# Patient Record
Sex: FEMALE | Race: BLACK OR AFRICAN AMERICAN | NOT HISPANIC OR LATINO | Employment: UNEMPLOYED | URBAN - METROPOLITAN AREA
[De-identification: names, ages, dates, MRNs, and addresses within clinical notes are randomized per-mention and may not be internally consistent; named-entity substitution may affect disease eponyms.]

---

## 2023-04-19 ENCOUNTER — OFFICE VISIT (OUTPATIENT)
Dept: PRIMARY CARE | Facility: CLINIC | Age: 63
End: 2023-04-19
Payer: MEDICARE

## 2023-04-19 VITALS
HEART RATE: 104 BPM | TEMPERATURE: 97.5 F | OXYGEN SATURATION: 97 % | SYSTOLIC BLOOD PRESSURE: 122 MMHG | DIASTOLIC BLOOD PRESSURE: 78 MMHG | WEIGHT: 178 LBS

## 2023-04-19 DIAGNOSIS — Z12.31 ENCOUNTER FOR SCREENING MAMMOGRAM FOR MALIGNANT NEOPLASM OF BREAST: ICD-10-CM

## 2023-04-19 DIAGNOSIS — G43.009 MIGRAINE WITHOUT AURA, NOT REFRACTORY: ICD-10-CM

## 2023-04-19 DIAGNOSIS — F32.1 MODERATE MAJOR DEPRESSION, SINGLE EPISODE (MULTI): Primary | ICD-10-CM

## 2023-04-19 DIAGNOSIS — F17.200 NICOTINE USE DISORDER: ICD-10-CM

## 2023-04-19 DIAGNOSIS — E78.2 MIXED HYPERLIPIDEMIA: ICD-10-CM

## 2023-04-19 DIAGNOSIS — I10 ESSENTIAL HYPERTENSION: ICD-10-CM

## 2023-04-19 DIAGNOSIS — R73.01 IFG (IMPAIRED FASTING GLUCOSE): ICD-10-CM

## 2023-04-19 PROCEDURE — 3078F DIAST BP <80 MM HG: CPT | Performed by: FAMILY MEDICINE

## 2023-04-19 PROCEDURE — 3074F SYST BP LT 130 MM HG: CPT | Performed by: FAMILY MEDICINE

## 2023-04-19 PROCEDURE — 99214 OFFICE O/P EST MOD 30 MIN: CPT | Performed by: FAMILY MEDICINE

## 2023-04-19 RX ORDER — AMLODIPINE BESYLATE 10 MG/1
10 TABLET ORAL DAILY
COMMUNITY
End: 2023-04-19 | Stop reason: SDUPTHER

## 2023-04-19 RX ORDER — PERPHENAZINE/AMITRIPTYLINE HCL 2 MG-25 MG
TABLET ORAL
COMMUNITY
End: 2023-05-11 | Stop reason: WASHOUT

## 2023-04-19 RX ORDER — VENLAFAXINE HYDROCHLORIDE 75 MG/1
75 CAPSULE, EXTENDED RELEASE ORAL EVERY MORNING
COMMUNITY
End: 2023-10-18 | Stop reason: SDUPTHER

## 2023-04-19 RX ORDER — AMLODIPINE BESYLATE 10 MG/1
10 TABLET ORAL DAILY
Qty: 90 TABLET | Refills: 1 | Status: SHIPPED | OUTPATIENT
Start: 2023-04-19 | End: 2023-10-18 | Stop reason: SDUPTHER

## 2023-04-19 NOTE — Clinical Note
Can you please call patient to let her know that I placed an order for mammogram, make sure to schedule that appointment with juaquin

## 2023-04-19 NOTE — PATIENT INSTRUCTIONS
Chronic conditions controlled  Encouraged to continue eating healthy and exercising daily   Medications were reviewed and refilled   Discussed following  Tobacco use: Encouraged to discontinue smoking, patient is not ready  IFG: Encouraged to decrease carbs/sweets to improve A1c  Preventative: Mammogram ordered, discussed trying Cologuard or repeating colonoscopy patient is hesitant  Follow up in 6 months

## 2023-04-19 NOTE — PROGRESS NOTES
Assessment     ASSESSMENT/PLAN:      Problem List Items Addressed This Visit          Circulatory    Essential hypertension    Relevant Medications    amLODIPine (Norvasc) 10 mg tablet       Endocrine/Metabolic    IFG (impaired fasting glucose)    Relevant Orders    Hemoglobin A1c    Comprehensive Metabolic Panel       Other    Moderate major depression, single episode (CMS/HCC) - Primary    Migraine without aura, not refractory    Nicotine use disorder     Other Visit Diagnoses       Mixed hyperlipidemia        Relevant Orders    Lipid panel    Encounter for screening mammogram for malignant neoplasm of breast        Relevant Orders    BI mammo bilateral screening tomosynthesis            Patient Instructions:  Patient Instructions   Chronic conditions controlled  Encouraged to continue eating healthy and exercising daily   Medications were reviewed and refilled   Discussed following  Tobacco use: Encouraged to discontinue smoking, patient is not ready  IFG: Encouraged to decrease carbs/sweets to improve A1c  Preventative: Mammogram ordered, discussed trying Cologuard or repeating colonoscopy patient is hesitant  Follow up in 6 months      Signed by: Anjum Fortune,        FUTURE DIRECTION:   Continue encouraging tobacco cessation, review mammogram and labs    Subjective   SUBJECTIVE:     HPI : Patient is a 62 y.o. female who presents today for the following:     HTN: Amlodipine 10 mg,   - has not been adhering to low sodium diet   - still smoking cigarettes, not ready to quit    Chronic Pain   - use tumeric daily, and otc NSAIDs   - Dr. Jaimes  for pain management     Anxiety/depression:  -Venlafaxine 75 mg, follows psych at [facility]    IFG:  -Poor diet, active with her grandchildren    Preventative  Mammogram: last one 2020, nml, yearly , not interested   Colonoscopy: done 2016, needs to 5 years   Vaccination: Declines flu  Pap: S/p hysterectomy    Review of Systems   All other systems reviewed and are  negative.      History reviewed. No pertinent past medical history.     History reviewed. No pertinent surgical history.     Current Outpatient Medications   Medication Instructions    amLODIPine (NORVASC) 10 mg, oral, Daily    fish oil concentrate (Omega-3) 120-180 mg capsule oral    flaxseed-omega3,6,9-fatty acid 1,300-670-155 mg capsule oral    venlafaxine XR (EFFEXOR-XR) 75 mg, oral, Every morning        Allergies   Allergen Reactions    Penicillin Other        Social History     Socioeconomic History    Marital status:      Spouse name: Not on file    Number of children: Not on file    Years of education: Not on file    Highest education level: Not on file   Occupational History    Not on file   Tobacco Use    Smoking status: Every Day     Packs/day: 2.00     Types: Cigarettes    Smokeless tobacco: Never   Vaping Use    Vaping status: Not on file   Substance and Sexual Activity    Alcohol use: Not on file    Drug use: Not on file    Sexual activity: Not on file   Other Topics Concern    Not on file   Social History Narrative    Not on file     Social Determinants of Health     Financial Resource Strain: Not on file   Food Insecurity: Not on file   Transportation Needs: Not on file   Physical Activity: Not on file   Stress: Not on file   Social Connections: Not on file   Intimate Partner Violence: Not on file   Housing Stability: Not on file        No family history on file.     Objective     OBJECTIVE:     Vitals:    04/19/23 1028   BP: 122/78   Pulse: 104   Temp: 36.4 °C (97.5 °F)   SpO2: 97%   Weight: 80.7 kg (178 lb)        Physical Exam  HENT:      Head: Normocephalic and atraumatic.      Nose: Nose normal.      Mouth/Throat:      Mouth: Mucous membranes are moist.   Eyes:      Pupils: Pupils are equal, round, and reactive to light.   Cardiovascular:      Rate and Rhythm: Normal rate and regular rhythm.      Pulses: Normal pulses.      Heart sounds: No murmur heard.  Pulmonary:      Effort: Pulmonary  effort is normal.      Breath sounds: Normal breath sounds.   Abdominal:      Tenderness: There is no abdominal tenderness.   Musculoskeletal:         General: Normal range of motion.      Cervical back: Normal range of motion.   Skin:     General: Skin is warm and dry.   Neurological:      Mental Status: She is alert.   Psychiatric:         Mood and Affect: Mood normal.

## 2023-04-26 RX ORDER — AMLODIPINE BESYLATE 10 MG/1
TABLET ORAL
Qty: 90 TABLET | Refills: 1 | OUTPATIENT
Start: 2023-04-26

## 2023-04-27 NOTE — TELEPHONE ENCOUNTER
----- Message from Zhang France MD sent at 4/27/2023  1:21 PM EDT -----  Please let pt know that her mammogram was normal.    ----- Message -----  From: [facility] - Radiology Results In  Sent: 4/27/2023   9:07 AM EDT  To: Anjum Fortune DO

## 2023-04-27 NOTE — TELEPHONE ENCOUNTER
----- Message from Zhang France MD sent at 4/27/2023  1:21 PM EDT -----  Please let pt know that her mammogram was normal.    ----- Message -----  From: [facility] - Radiology Results In  Sent: 4/27/2023   9:07 AM EDT  To: Anjum Fortune DO

## 2023-05-11 ENCOUNTER — OFFICE VISIT (OUTPATIENT)
Dept: PRIMARY CARE | Facility: CLINIC | Age: 63
End: 2023-05-11
Payer: MEDICARE

## 2023-05-11 VITALS
OXYGEN SATURATION: 96 % | HEART RATE: 88 BPM | SYSTOLIC BLOOD PRESSURE: 120 MMHG | WEIGHT: 179 LBS | DIASTOLIC BLOOD PRESSURE: 72 MMHG

## 2023-05-11 DIAGNOSIS — L30.8 OTHER ECZEMA: Primary | ICD-10-CM

## 2023-05-11 PROCEDURE — 3078F DIAST BP <80 MM HG: CPT | Performed by: FAMILY MEDICINE

## 2023-05-11 PROCEDURE — 3074F SYST BP LT 130 MM HG: CPT | Performed by: FAMILY MEDICINE

## 2023-05-11 PROCEDURE — 99214 OFFICE O/P EST MOD 30 MIN: CPT | Performed by: FAMILY MEDICINE

## 2023-05-11 RX ORDER — CLOBETASOL PROPIONATE 0.5 MG/G
CREAM TOPICAL 2 TIMES DAILY
Qty: 180 G | Refills: 0 | Status: SHIPPED | OUTPATIENT
Start: 2023-05-11 | End: 2023-06-10

## 2023-05-11 NOTE — PROGRESS NOTES
Assessment     ASSESSMENT/PLAN:      Problem List Items Addressed This Visit    None  Visit Diagnoses       Other eczema    -  Primary    Relevant Medications    clobetasol (Temovate) 0.05 % cream          Patient Instructions:  Patient Instructions   Eczema: Try clobetasol, can call if no improvement in a few days for steroid taper, note provided for patient      Signed by: Anjum Fortune DO       FUTURE DIRECTION:   Consider 7-day steroid taper    Subjective   SUBJECTIVE:     HPI : Patient is a 62 y.o. female who presents today for the following:     Rash   State sthats he has history of eczema  - noticed on both hand, burning, itcching, and purulent discharge  - states that had burning with different creams she has tried, coconut oil seems to improve symptoms  -Was seen by dermatology in the past, had tried UV lights  -Tried old prescription of triamcinolone without significant improvement      Review of Systems   All other systems reviewed and are negative.      No past medical history on file.     No past surgical history on file.     Current Outpatient Medications   Medication Instructions    amLODIPine (NORVASC) 10 mg, oral, Daily    clobetasol (Temovate) 0.05 % cream Topical, 2 times daily    fish oil concentrate (Omega-3) 120-180 mg capsule oral    flaxseed-omega3,6,9-fatty acid 1,300-670-155 mg capsule oral    venlafaxine XR (EFFEXOR-XR) 75 mg, oral, Every morning        Allergies   Allergen Reactions    Penicillin Other        Social History     Socioeconomic History    Marital status:      Spouse name: Not on file    Number of children: Not on file    Years of education: Not on file    Highest education level: Not on file   Occupational History    Not on file   Tobacco Use    Smoking status: Every Day     Packs/day: 2.00     Types: Cigarettes    Smokeless tobacco: Never   Vaping Use    Vaping status: Not on file   Substance and Sexual Activity    Alcohol use: Not on file    Drug use: Not  on file    Sexual activity: Not on file   Other Topics Concern    Not on file   Social History Narrative    Not on file     Social Determinants of Health     Financial Resource Strain: Not on file   Food Insecurity: Not on file   Transportation Needs: Not on file   Physical Activity: Not on file   Stress: Not on file   Social Connections: Not on file   Intimate Partner Violence: Not on file   Housing Stability: Not on file        No family history on file.     Objective     OBJECTIVE:     Vitals:    05/11/23 0940   BP: 120/72   Pulse: 88   SpO2: 96%   Weight: 81.2 kg (179 lb)        Physical Exam  Constitutional:       Appearance: Normal appearance.   HENT:      Head: Normocephalic.   Pulmonary:      Effort: Pulmonary effort is normal.   Musculoskeletal:      Cervical back: Normal range of motion.   Skin:     Comments: Bilateral hands with diffuse dryness, cracking of skin, evidence of excoriation, mildly erythematous   Neurological:      Mental Status: She is alert.   Psychiatric:         Mood and Affect: Mood normal.

## 2023-05-11 NOTE — PATIENT INSTRUCTIONS
Eczema: Try clobetasol, can call if no improvement in a few days for steroid taper, note provided for patient

## 2023-05-11 NOTE — LETTER
Gisel Downs is a patient at [facility]. She does not have any medical conditions that would prevent her from donating plasma. Please call if you have any questions.       Kind Regards,   Anjum Fortune, DO

## 2023-07-19 ENCOUNTER — OFFICE VISIT (OUTPATIENT)
Dept: PRIMARY CARE | Facility: CLINIC | Age: 63
End: 2023-07-19
Payer: MEDICARE

## 2023-07-19 VITALS
HEART RATE: 81 BPM | TEMPERATURE: 97.9 F | SYSTOLIC BLOOD PRESSURE: 126 MMHG | DIASTOLIC BLOOD PRESSURE: 72 MMHG | WEIGHT: 179 LBS | OXYGEN SATURATION: 94 %

## 2023-07-19 DIAGNOSIS — L30.8 OTHER ECZEMA: Primary | ICD-10-CM

## 2023-07-19 PROBLEM — L30.1 DYSHIDROTIC DERMATITIS: Status: ACTIVE | Noted: 2023-07-19

## 2023-07-19 PROCEDURE — 3078F DIAST BP <80 MM HG: CPT | Performed by: FAMILY MEDICINE

## 2023-07-19 PROCEDURE — 99213 OFFICE O/P EST LOW 20 MIN: CPT | Performed by: FAMILY MEDICINE

## 2023-07-19 PROCEDURE — 3074F SYST BP LT 130 MM HG: CPT | Performed by: FAMILY MEDICINE

## 2023-07-19 RX ORDER — PREDNISONE 10 MG/1
TABLET ORAL
Qty: 20 TABLET | Refills: 0 | Status: SHIPPED | OUTPATIENT
Start: 2023-07-19 | End: 2023-07-28

## 2023-07-19 RX ORDER — PREDNISONE 10 MG/1
TABLET ORAL
Qty: 11 TABLET | Refills: 0 | Status: SHIPPED | OUTPATIENT
Start: 2023-07-19 | End: 2023-07-19 | Stop reason: SDUPTHER

## 2023-07-19 NOTE — PROGRESS NOTES
Assessment/Plan   ASSESSMENT/PLAN:      Gisel was seen today for eczema.  Diagnoses and all orders for this visit:  Other eczema (Primary)  -     Discontinue: predniSONE (Deltasone) 10 mg tablet; Take 4 tablets (40 mg) by mouth once daily for 1 day, THEN 3 tablets (30 mg) once daily for 1 day, THEN 2 tablets (20 mg) once daily for 1 day, THEN 1 tablet (10 mg) once daily for 1 day, THEN 0.5 tablets (5 mg) once daily for 2 days.  -     predniSONE (Deltasone) 10 mg tablet; Take 4 tablets (40 mg) by mouth once daily for 2 days, THEN 3 tablets (30 mg) once daily for 2 days, THEN 2 tablets (20 mg) once daily for 2 days, THEN 1 tablet (10 mg) once daily for 1 day, THEN 0.5 tablets (5 mg) once daily for 2 days.       Patient Instructions   Eczema: prednisone taper ordered, continue clobetasol, call for derm referral if no improvement     Anjum Fortune, DO     FUTURE DIRECTION:     Subjective   SUBJECTIVE:     Patient ID: Gisel Downs is a 62 y.o. femalefor the following:    Eczema  - worsening since recent travel  - notice clear to cloudy drainage   - clobetasol ineffective     Review of Systems   Skin:  Positive for rash.       Allergies   Allergen Reactions    Penicillin Other         Current Outpatient Medications:     amLODIPine (Norvasc) 10 mg tablet, Take 1 tablet (10 mg) by mouth once daily., Disp: 90 tablet, Rfl: 1    cholecalciferol, vitamin D3, (VITAMIN D3 ORAL), Take by mouth., Disp: , Rfl:     omega-3/dha/epa/fish oil (OMEGA-3 FISH OIL ORAL), Take by mouth., Disp: , Rfl:     TURMERIC ORAL, Take by mouth., Disp: , Rfl:     venlafaxine XR (Effexor-XR) 75 mg 24 hr capsule, Take 1 capsule (75 mg) by mouth once daily in the morning., Disp: , Rfl:     vitamin E acetate (VITAMIN E ORAL), Take by mouth., Disp: , Rfl:     predniSONE (Deltasone) 10 mg tablet, Take 4 tablets (40 mg) by mouth once daily for 2 days, THEN 3 tablets (30 mg) once daily for 2 days, THEN 2 tablets (20 mg) once daily for 2 days, THEN 1  tablet (10 mg) once daily for 1 day, THEN 0.5 tablets (5 mg) once daily for 2 days., Disp: 20 tablet, Rfl: 0     Patient Active Problem List   Diagnosis    Moderate major depression, single episode (CMS/HCC)    Essential hypertension    Migraine without aura, not refractory    Nicotine use disorder    IFG (impaired fasting glucose)    Dyshidrotic dermatitis       Social History     Socioeconomic History    Marital status:      Spouse name: Not on file    Number of children: Not on file    Years of education: Not on file    Highest education level: Not on file   Occupational History    Not on file   Tobacco Use    Smoking status: Every Day     Packs/day: 2.00     Types: Cigarettes    Smokeless tobacco: Never   Substance and Sexual Activity    Alcohol use: Not on file    Drug use: Not on file    Sexual activity: Not on file   Other Topics Concern    Not on file   Social History Narrative    Not on file     Social Determinants of Health     Financial Resource Strain: Not on file   Food Insecurity: Not on file   Transportation Needs: Not on file   Physical Activity: Not on file   Stress: Not on file   Social Connections: Not on file   Intimate Partner Violence: Not on file   Housing Stability: Not on file       Objective   OBJECTIVE:     Vitals:    07/19/23 1028   BP: 126/72   Pulse: 81   Temp: 36.6 °C (97.9 °F)   SpO2: 94%       Exam      Physical Exam  Constitutional:       Appearance: Normal appearance.   HENT:      Head: Normocephalic.   Pulmonary:      Effort: Pulmonary effort is normal.   Musculoskeletal:      Cervical back: Normal range of motion.   Skin:     Findings: Rash present. Rash is scaling (on bilateral hands, no purulent drainage, +errythema).   Neurological:      Mental Status: She is alert.   Psychiatric:         Mood and Affect: Mood normal.

## 2023-08-15 ENCOUNTER — TELEPHONE (OUTPATIENT)
Dept: PRIMARY CARE | Facility: CLINIC | Age: 63
End: 2023-08-15
Payer: MEDICARE

## 2023-08-15 DIAGNOSIS — L30.8 OTHER ECZEMA: Primary | ICD-10-CM

## 2023-08-15 NOTE — TELEPHONE ENCOUNTER
Pt called rx line at 1209p requesting a refill on prednisone.    Pt was seen 7/19/23, per note if not better, ok for derm referral.  Can you place referral for derm? Thx

## 2023-08-29 RX ORDER — PREDNISONE 10 MG/1
10 TABLET ORAL DAILY
OUTPATIENT
Start: 2023-08-29

## 2023-08-29 RX ORDER — PREDNISONE 10 MG/1
10 TABLET ORAL DAILY
COMMUNITY
End: 2023-10-18

## 2023-08-29 NOTE — TELEPHONE ENCOUNTER
Pt called requesting pended med    Pt cannot get in with derm until 12/4/23  Pt is requesting a refill on pended med, to get her until that derm appt, since this med worked well for her.  Pt uses [facility]    Next OV 10/18 - eoi    Please advise

## 2023-09-07 ENCOUNTER — TELEMEDICINE (OUTPATIENT)
Dept: PRIMARY CARE | Facility: CLINIC | Age: 63
End: 2023-09-07
Payer: MEDICARE

## 2023-09-07 NOTE — PROGRESS NOTES
Assessment     ASSESSMENT/PLAN:      Problem List Items Addressed This Visit    None        Patient Instructions:  There are no Patient Instructions on file for this visit.      Patient consented to virtual encounter. If patient was felt to need in-person evaluation they were directed to the office or ED as appropriate. Total time spent interacting with patient was [***] minutes.     Patient presents today via video telemedicine encounter. This encounter is not taking place in person due to COVID.    Signed by: Anjum Fortune DO       FUTURE DIRECTION:   [***]    Subjective     SUBJECTIVE:     HPI : Patient is a 62 y.o. female who presents today via video telemedicine encounter to discuss the following:        Review of Systems    Objective   OBJECTIVE:     There were no vitals filed for this visit.    Physical Exam

## 2023-10-18 ENCOUNTER — OFFICE VISIT (OUTPATIENT)
Dept: PRIMARY CARE | Facility: CLINIC | Age: 63
End: 2023-10-18
Payer: MEDICARE

## 2023-10-18 VITALS
HEIGHT: 68 IN | OXYGEN SATURATION: 98 % | HEART RATE: 97 BPM | TEMPERATURE: 97.2 F | SYSTOLIC BLOOD PRESSURE: 128 MMHG | WEIGHT: 178 LBS | BODY MASS INDEX: 26.98 KG/M2 | DIASTOLIC BLOOD PRESSURE: 80 MMHG

## 2023-10-18 DIAGNOSIS — Z00.00 ROUTINE GENERAL MEDICAL EXAMINATION AT A HEALTH CARE FACILITY: ICD-10-CM

## 2023-10-18 DIAGNOSIS — F32.1 MODERATE MAJOR DEPRESSION, SINGLE EPISODE (MULTI): ICD-10-CM

## 2023-10-18 DIAGNOSIS — F17.200 NICOTINE USE DISORDER: ICD-10-CM

## 2023-10-18 DIAGNOSIS — I10 ESSENTIAL HYPERTENSION: ICD-10-CM

## 2023-10-18 DIAGNOSIS — L30.8 OTHER ECZEMA: ICD-10-CM

## 2023-10-18 DIAGNOSIS — Z12.11 COLON CANCER SCREENING: ICD-10-CM

## 2023-10-18 DIAGNOSIS — L08.9 SKIN INFECTION: Primary | ICD-10-CM

## 2023-10-18 PROCEDURE — 99396 PREV VISIT EST AGE 40-64: CPT | Performed by: FAMILY MEDICINE

## 2023-10-18 PROCEDURE — 3074F SYST BP LT 130 MM HG: CPT | Performed by: FAMILY MEDICINE

## 2023-10-18 PROCEDURE — 99214 OFFICE O/P EST MOD 30 MIN: CPT | Performed by: FAMILY MEDICINE

## 2023-10-18 PROCEDURE — 3079F DIAST BP 80-89 MM HG: CPT | Performed by: FAMILY MEDICINE

## 2023-10-18 PROCEDURE — G0439 PPPS, SUBSEQ VISIT: HCPCS | Performed by: FAMILY MEDICINE

## 2023-10-18 RX ORDER — CLOBETASOL PROPIONATE 0.46 MG/ML
SOLUTION TOPICAL 2 TIMES DAILY
Qty: 90 ML | Refills: 2 | Status: SHIPPED | OUTPATIENT
Start: 2023-10-18 | End: 2023-10-18 | Stop reason: WASHOUT

## 2023-10-18 RX ORDER — DOXYCYCLINE 100 MG/1
100 CAPSULE ORAL 2 TIMES DAILY
Qty: 14 CAPSULE | Refills: 0 | Status: SHIPPED | OUTPATIENT
Start: 2023-10-18 | End: 2023-10-25

## 2023-10-18 RX ORDER — VENLAFAXINE HYDROCHLORIDE 75 MG/1
75 CAPSULE, EXTENDED RELEASE ORAL EVERY MORNING
Qty: 90 CAPSULE | Refills: 1 | Status: SHIPPED | OUTPATIENT
Start: 2023-11-18 | End: 2024-04-17 | Stop reason: SDUPTHER

## 2023-10-18 RX ORDER — CLOBETASOL PROPIONATE 0.5 MG/G
1 EMULSION TOPICAL 2 TIMES DAILY
Qty: 90 G | Refills: 2 | Status: SHIPPED | OUTPATIENT
Start: 2023-10-18 | End: 2023-11-01

## 2023-10-18 RX ORDER — AMLODIPINE BESYLATE 10 MG/1
10 TABLET ORAL DAILY
Qty: 90 TABLET | Refills: 1 | Status: SHIPPED | OUTPATIENT
Start: 2023-10-18 | End: 2024-04-17 | Stop reason: SDUPTHER

## 2023-10-18 ASSESSMENT — PATIENT HEALTH QUESTIONNAIRE - PHQ9
10. IF YOU CHECKED OFF ANY PROBLEMS, HOW DIFFICULT HAVE THESE PROBLEMS MADE IT FOR YOU TO DO YOUR WORK, TAKE CARE OF THINGS AT HOME, OR GET ALONG WITH OTHER PEOPLE: NOT DIFFICULT AT ALL
1. LITTLE INTEREST OR PLEASURE IN DOING THINGS: SEVERAL DAYS
2. FEELING DOWN, DEPRESSED OR HOPELESS: SEVERAL DAYS
SUM OF ALL RESPONSES TO PHQ9 QUESTIONS 1 AND 2: 2

## 2023-10-18 ASSESSMENT — ACTIVITIES OF DAILY LIVING (ADL)
DOING_HOUSEWORK: INDEPENDENT
TAKING_MEDICATION: INDEPENDENT
GROCERY_SHOPPING: INDEPENDENT
BATHING: INDEPENDENT
MANAGING_FINANCES: INDEPENDENT
DRESSING: INDEPENDENT

## 2023-10-18 NOTE — PROGRESS NOTES
Assessment     ASSESSMENT/PLAN:      Problem List Items Addressed This Visit       Moderate major depression, single episode (CMS/HCC)    Relevant Medications    venlafaxine XR (Effexor-XR) 75 mg 24 hr capsule (Start on 11/18/2023)    Essential hypertension    Relevant Medications    amLODIPine (Norvasc) 10 mg tablet    Nicotine use disorder     Other Visit Diagnoses       Skin infection    -  Primary    Relevant Medications    doxycycline (Vibramycin) 100 mg capsule    Other eczema        Relevant Medications    clobetasoL-emollient 0.05 % cream    Colon cancer screening        Relevant Orders    Colonoscopy Screening    Routine general medical examination at a health care facility                Patient Instructions:  Patient Instructions   Eczema: Symptoms worsening with drainage, will try doxycycline, also advised to use clobetasol with thick emollient, Vaseline is okay, continue appointment with dermatology  Preventative: Make sure to get labs done  Tobacco use: Patient not ready to quit      Signed by: Anjum Fortune DO       FUTURE DIRECTION:       Subjective   SUBJECTIVE:     HPI : Patient is a 63 y.o. female who presents today for the following:     HTN: Amlodipine 10 mg,   - has not been adhering to low sodium diet   - still smoking cigarettes, not ready to quit    Chronic Pain   - use tumeric daily, and otc NSAIDs   - Dr. Jaimes  for pain management     Anxiety/depression:  -Venlafaxine 75 mg, follows psych at [facility]    IFG:  -Poor diet, active with her grandchildren    Preventative  Mammogram: last one 2023, nml, yearly ,  Colonoscopy: done 2016, needs to 5 years   Vaccination: Declines flu  Pap: S/p hysterectomy    Review of Systems   All other systems reviewed and are negative.      Past Medical History:   Diagnosis Date    Depression 1990    Eczema 1992    Hypertension 2018    Varicella 1968        Past Surgical History:   Procedure Laterality Date    HYSTERECTOMY      TONSILLECTOMY  1972     "    Current Outpatient Medications   Medication Instructions    amLODIPine (NORVASC) 10 mg, oral, Daily    cholecalciferol, vitamin D3, (VITAMIN D3 ORAL) oral    clobetasoL-emollient 0.05 % cream 1 Application, topical (top), 2 times daily    doxycycline (VIBRAMYCIN) 100 mg, oral, 2 times daily, Take with at least 8 ounces (large glass) of water, do not lie down for 30 minutes after    TURMERIC ORAL oral    [START ON 11/18/2023] venlafaxine XR (EFFEXOR-XR) 75 mg, oral, Every morning    vitamin E acetate (VITAMIN E ORAL) oral        Allergies   Allergen Reactions    Penicillin Other        Social History     Socioeconomic History    Marital status:      Spouse name: Not on file    Number of children: Not on file    Years of education: Not on file    Highest education level: Not on file   Occupational History    Not on file   Tobacco Use    Smoking status: Every Day     Packs/day: 2.00     Years: 30.00     Additional pack years: 0.00     Total pack years: 60.00     Types: Cigarettes    Smokeless tobacco: Never   Substance and Sexual Activity    Alcohol use: Yes     Alcohol/week: 3.0 standard drinks of alcohol     Types: 3 Cans of beer per week    Drug use: Not Currently     Types: Marijuana    Sexual activity: Not Currently     Partners: Male     Comment: Hysterectomy   Other Topics Concern    Not on file   Social History Narrative    Not on file     Social Determinants of Health     Financial Resource Strain: Not on file   Food Insecurity: Not on file   Transportation Needs: Not on file   Physical Activity: Not on file   Stress: Not on file   Social Connections: Not on file   Intimate Partner Violence: Not on file   Housing Stability: Not on file        Family History   Problem Relation Name Age of Onset    Alcohol abuse Brother Alex         Objective     OBJECTIVE:     Vitals:    10/18/23 1022   BP: 128/80   Pulse: 97   Temp: 36.2 °C (97.2 °F)   SpO2: 98%   Weight: 80.7 kg (178 lb)   Height: 1.727 m (5' 8\") "        Physical Exam  HENT:      Head: Normocephalic and atraumatic.      Nose: Nose normal.      Mouth/Throat:      Mouth: Mucous membranes are moist.   Eyes:      Pupils: Pupils are equal, round, and reactive to light.   Cardiovascular:      Rate and Rhythm: Normal rate and regular rhythm.      Pulses: Normal pulses.      Heart sounds: No murmur heard.  Pulmonary:      Effort: Pulmonary effort is normal.      Breath sounds: Normal breath sounds.   Abdominal:      Tenderness: There is no abdominal tenderness.   Musculoskeletal:         General: Normal range of motion.      Cervical back: Normal range of motion.   Skin:     General: Skin is warm and dry.      Comments: Diffuse crusting, purulent drainage, dryness, on bilateral hands   Neurological:      Mental Status: She is alert.   Psychiatric:         Mood and Affect: Mood normal.

## 2023-10-18 NOTE — PATIENT INSTRUCTIONS
Eczema: Symptoms worsening with drainage, will try doxycycline, also advised to use clobetasol with thick emollient, Vaseline is okay, continue appointment with dermatology  Preventative: Make sure to get labs done  Tobacco use: Patient not ready to quit

## 2023-10-19 ENCOUNTER — OFFICE VISIT (OUTPATIENT)
Dept: DERMATOLOGY | Facility: CLINIC | Age: 63
End: 2023-10-19
Payer: MEDICARE

## 2023-10-19 DIAGNOSIS — L30.9 HAND ECZEMA: Primary | ICD-10-CM

## 2023-10-19 DIAGNOSIS — L85.3 XEROSIS CUTIS: ICD-10-CM

## 2023-10-19 DIAGNOSIS — L30.1 ECZEMA, DYSHIDROTIC: ICD-10-CM

## 2023-10-19 PROCEDURE — 99204 OFFICE O/P NEW MOD 45 MIN: CPT | Performed by: DERMATOLOGY

## 2023-10-19 RX ORDER — FLUOCINONIDE 0.5 MG/G
OINTMENT TOPICAL
Qty: 60 G | Refills: 2 | Status: SHIPPED | OUTPATIENT
Start: 2023-10-19 | End: 2024-04-17 | Stop reason: WASHOUT

## 2023-10-19 NOTE — PROGRESS NOTES
Subjective     Gisel Downs is a 63 y.o. female who presents for the following: Eczema (On hands).  She states the rash on her hands began 8 months ago and has worsened since.  She notes the areas are red, scaly, and itchy and sometimes burn.  She reports a history of dyshidrotic eczema.    Review of Systems:  No other skin or systemic complaints other than what is documented elsewhere in the note.    The following portions of the chart were reviewed this encounter and updated as appropriate:       Skin Cancer History  No skin cancer on file.    Specialty Problems          Dermatology Problems    Dyshidrotic dermatitis       Past Dermatologic / Past Relevant Medical History:    - reported history of eczema, including dyshidrotic eczema, and allergic rhinitis  - no h/o asthma or psoriasis    Family History:    Grandchildren - eczema  No family history of psoriasis    Social History:    The patient smokes cigarettes    Allergies:  Penicillin    Current Medications / CAM's:    Current Outpatient Medications:     amLODIPine (Norvasc) 10 mg tablet, Take 1 tablet (10 mg) by mouth once daily., Disp: 90 tablet, Rfl: 1    cholecalciferol, vitamin D3, (VITAMIN D3 ORAL), Take by mouth., Disp: , Rfl:     clobetasoL-emollient 0.05 % cream, Apply 1 Application topically 2 times a day for 14 days., Disp: 90 g, Rfl: 2    doxycycline (Vibramycin) 100 mg capsule, Take 1 capsule (100 mg) by mouth 2 times a day for 7 days. Take with at least 8 ounces (large glass) of water, do not lie down for 30 minutes after, Disp: 14 capsule, Rfl: 0    fluocinonide (Lidex) 0.05 % ointment, Apply twice daily to affected areas of hands (avoid face, groin, body folds) for 2 weeks, taper to weekends only for persistent areas; repeat every 6-8 weeks as needed for flares, Disp: 60 g, Rfl: 2    TURMERIC ORAL, Take by mouth., Disp: , Rfl:     [START ON 11/18/2023] venlafaxine XR (Effexor-XR) 75 mg 24 hr capsule, Take 1 capsule (75 mg) by mouth once daily in  the morning. Do not start before November 18, 2023., Disp: 90 capsule, Rfl: 1    vitamin E acetate (VITAMIN E ORAL), Take by mouth., Disp: , Rfl:      Objective   Well appearing patient in no apparent distress; mood and affect are within normal limits.    A skin examination was performed including the face and neck and bilateral hands. All findings within normal limits unless otherwise noted below.    Assessment/Plan   1. Hand eczema  Left Hand - Anterior, Right Hand - Anterior  On the patient's bilateral hands, there are similar-appearing and symmetric erythematous, scaly, slightly lichenified, thickened plaques with superficial fissures as well as several clearish yellowish vesicles and erythematous macules with collarettes of scale along the sides of her fingers    Hand Dermatitis with dyshidrotic eczema - bilateral hands.  The potentially chronic and intermittently flaring nature of this condition and treatment options were discussed extensively with the patient today.  At this time, I recommend topical steroid therapy with fluocinonide 0.05% ointment, which the patient was instructed to apply twice daily to the affected areas of her hands (avoid face, groin, body folds) for the next 2-3 weeks, followed by taper to twice daily on weekends only for persistent areas; the patient may repeat treatment in a 2-week burst-and-taper fashion every 6-8 weeks as needed for future flares.    I also emphasized the importance of dry, sensitive skin care and good hand hygiene, including minimizing the frequency and duration of hand-washing as much as is safely possible, given the current coronavirus pandemic; using a lukewarm, but not hot water and a mild soap, such as Dove; and frequent and aggressive moisturization, at least twice daily and immediately following hand-washing, with recommended over-the-counter moisturizing creams, such as Eucerin, Cetaphil, Cerave, or Aveeno; Vaseline or Aquaphor ointments; or Neutrogena  Norwegian Formula Hand Cream.  The risks, benefits, and side effects of this medication, including possible skin atrophy with overuse of topical steroids, were discussed.  Last, I strongly encouraged smoking cessation, as cigarette smoking may likely be exacerbating this condition.  The patient expressed understanding and is in agreement with this plan.    Related Medications  fluocinonide (Lidex) 0.05 % ointment  Apply twice daily to affected areas of hands (avoid face, groin, body folds) for 2 weeks, taper to weekends only for persistent areas; repeat every 6-8 weeks as needed for flares    2. Eczema, dyshidrotic    3. Xerosis cutis  Diffuse generalized dry, scaly skin.    Xerosis.  I emphasized the importance of dry, sensitive skin care, including the use of a mild soap, such as Dove, and frequent and aggressive moisturization, at least twice daily and immediately following showers or baths, with recommended over-the-counter moisturizing creams, such as Eucerin, Cetaphil, Cerave, or Aveeno, or Vaseline or Aquaphor ointments.  The patient was also provided an informational hand-out today containing further details on dry skin care.

## 2023-12-04 ENCOUNTER — APPOINTMENT (OUTPATIENT)
Dept: DERMATOLOGY | Facility: CLINIC | Age: 63
End: 2023-12-04
Payer: MEDICARE

## 2024-02-06 ENCOUNTER — PATIENT MESSAGE (OUTPATIENT)
Dept: PRIMARY CARE | Facility: CLINIC | Age: 64
End: 2024-02-06
Payer: COMMERCIAL

## 2024-02-06 DIAGNOSIS — L30.8 OTHER ECZEMA: Primary | ICD-10-CM

## 2024-02-06 RX ORDER — CLOBETASOL PROPIONATE 0.05 MG/G
1 GEL TOPICAL 2 TIMES DAILY
Qty: 90 G | Refills: 0 | Status: SHIPPED | OUTPATIENT
Start: 2024-02-06 | End: 2024-02-20

## 2024-04-17 ENCOUNTER — OFFICE VISIT (OUTPATIENT)
Dept: PRIMARY CARE | Facility: CLINIC | Age: 64
End: 2024-04-17
Payer: COMMERCIAL

## 2024-04-17 VITALS
SYSTOLIC BLOOD PRESSURE: 122 MMHG | OXYGEN SATURATION: 96 % | BODY MASS INDEX: 27.83 KG/M2 | DIASTOLIC BLOOD PRESSURE: 90 MMHG | HEART RATE: 94 BPM | WEIGHT: 183 LBS

## 2024-04-17 DIAGNOSIS — I10 ESSENTIAL HYPERTENSION: ICD-10-CM

## 2024-04-17 DIAGNOSIS — T50.905A ADVERSE EFFECT OF DRUG, INITIAL ENCOUNTER: Primary | ICD-10-CM

## 2024-04-17 DIAGNOSIS — Z12.11 SCREENING FOR COLON CANCER: ICD-10-CM

## 2024-04-17 DIAGNOSIS — F32.1 MODERATE MAJOR DEPRESSION, SINGLE EPISODE (MULTI): ICD-10-CM

## 2024-04-17 DIAGNOSIS — F17.200 NICOTINE USE DISORDER: ICD-10-CM

## 2024-04-17 DIAGNOSIS — Z72.0 TOBACCO USE: ICD-10-CM

## 2024-04-17 DIAGNOSIS — Z12.31 SCREENING MAMMOGRAM FOR BREAST CANCER: ICD-10-CM

## 2024-04-17 DIAGNOSIS — L30.1 DYSHIDROTIC DERMATITIS: ICD-10-CM

## 2024-04-17 PROCEDURE — 99214 OFFICE O/P EST MOD 30 MIN: CPT | Performed by: FAMILY MEDICINE

## 2024-04-17 PROCEDURE — 3080F DIAST BP >= 90 MM HG: CPT | Performed by: FAMILY MEDICINE

## 2024-04-17 PROCEDURE — 99406 BEHAV CHNG SMOKING 3-10 MIN: CPT | Performed by: FAMILY MEDICINE

## 2024-04-17 PROCEDURE — 3074F SYST BP LT 130 MM HG: CPT | Performed by: FAMILY MEDICINE

## 2024-04-17 PROCEDURE — 4004F PT TOBACCO SCREEN RCVD TLK: CPT | Performed by: FAMILY MEDICINE

## 2024-04-17 RX ORDER — VENLAFAXINE HYDROCHLORIDE 75 MG/1
75 CAPSULE, EXTENDED RELEASE ORAL EVERY MORNING
Qty: 90 CAPSULE | Refills: 1 | Status: SHIPPED | OUTPATIENT
Start: 2024-05-16 | End: 2024-11-12

## 2024-04-17 RX ORDER — LORATADINE 10 MG/1
10 TABLET ORAL DAILY
Qty: 30 TABLET | Refills: 2 | Status: SHIPPED | OUTPATIENT
Start: 2024-04-17 | End: 2024-05-10

## 2024-04-17 RX ORDER — AMLODIPINE BESYLATE 10 MG/1
10 TABLET ORAL DAILY
Qty: 90 TABLET | Refills: 1 | Status: SHIPPED | OUTPATIENT
Start: 2024-05-16 | End: 2024-11-12

## 2024-04-17 RX ORDER — PREDNISONE 10 MG/1
TABLET ORAL DAILY
Qty: 22 TABLET | Refills: 0 | Status: SHIPPED | OUTPATIENT
Start: 2024-04-17 | End: 2024-04-26

## 2024-04-17 NOTE — PATIENT INSTRUCTIONS
1. Essential hypertension  Blood pressure controlled, refill sent  - amLODIPine (Norvasc) 10 mg tablet; Take 1 tablet (10 mg) by mouth once daily. Do not start before May 16, 2024.  Dispense: 90 tablet; Refill: 1    2. Moderate major depression, single episode (Multi)  Mood has been controlled with Effexor, refill sent  - venlafaxine XR (Effexor-XR) 75 mg 24 hr capsule; Take 1 capsule (75 mg) by mouth once daily in the morning. Do not start before May 16, 2024.  Dispense: 90 capsule; Refill: 1    3. Adverse effect of drug, initial encounter  Rash seen on bilateral hands and neck seem to be a drug reaction, will try prednisone taper  - predniSONE (Deltasone) 10 mg tablet; Take 4 tablets (40 mg) by mouth once daily for 3 days, THEN 3 tablets (30 mg) once daily for 2 days, THEN 2 tablets (20 mg) once daily for 1 day, THEN 1 tablet (10 mg) once daily for 1 day, THEN 0.5 tablets (5 mg) once daily for 2 days.  Dispense: 22 tablet; Refill: 0    4. Dyshidrotic dermatitis  Advised patient to call modalities discussed new skin changes since starting Dupixent.  Advised patient to start Claritin to help with antihistamine reaction  - loratadine (Claritin) 10 mg tablet; Take 1 tablet (10 mg) by mouth once daily.  Dispense: 30 tablet; Refill: 2    5. Screening mammogram for breast cancer  Strongly advised patient to get mammogram yearly, order placed  - BI mammo bilateral screening tomosynthesis; Future    6. Encounter for screening colonoscopy  Strongly encourage patient to get colon cancer screening  - Cologuard® colon cancer screening; Future  - Cologuard® colon cancer screening     Tobacco cessation: spent 3-10 min discussing tobacco cessation, pt is not ready to quit   https://www.cdc.gov/tobacco/campaign/tips/partners/health/materials/eyvy-cxtecr-vlfkpdu-to-quit.pdf

## 2024-04-17 NOTE — PROGRESS NOTES
Assessment     ASSESSMENT/PLAN:      Patient Instructions   1. Essential hypertension  Blood pressure controlled, refill sent  - amLODIPine (Norvasc) 10 mg tablet; Take 1 tablet (10 mg) by mouth once daily. Do not start before May 16, 2024.  Dispense: 90 tablet; Refill: 1    2. Moderate major depression, single episode (Multi)  Mood has been controlled with Effexor, refill sent  - venlafaxine XR (Effexor-XR) 75 mg 24 hr capsule; Take 1 capsule (75 mg) by mouth once daily in the morning. Do not start before May 16, 2024.  Dispense: 90 capsule; Refill: 1    3. Adverse effect of drug, initial encounter  Rash seen on bilateral hands and neck seem to be a drug reaction, will try prednisone taper  - predniSONE (Deltasone) 10 mg tablet; Take 4 tablets (40 mg) by mouth once daily for 3 days, THEN 3 tablets (30 mg) once daily for 2 days, THEN 2 tablets (20 mg) once daily for 1 day, THEN 1 tablet (10 mg) once daily for 1 day, THEN 0.5 tablets (5 mg) once daily for 2 days.  Dispense: 22 tablet; Refill: 0    4. Dyshidrotic dermatitis  Advised patient to call modalities discussed new skin changes since starting Dupixent.  Advised patient to start Claritin to help with antihistamine reaction  - loratadine (Claritin) 10 mg tablet; Take 1 tablet (10 mg) by mouth once daily.  Dispense: 30 tablet; Refill: 2    5. Screening mammogram for breast cancer  Strongly advised patient to get mammogram yearly, order placed  - BI mammo bilateral screening tomosynthesis; Future    6. Encounter for screening colonoscopy  Strongly encourage patient to get colon cancer screening  - Cologuard® colon cancer screening; Future  - Cologuard® colon cancer screening     Tobacco cessation: spent 3-10 min discussing tobacco cessation, pt is not ready to quit   https://www.cdc.gov/tobacco/campaign/tips/partners/health/materials/xosi-lbhzwj-kgztdiw-to-quit.pdf          Signed by: Anjum Fortune DO       FUTURE DIRECTION:   []    Subjective    SUBJECTIVE:     HPI : Patient is a 63 y.o. female who presents today for the following:     HTN: Amlodipine 10 mg,   - has not been adhering to low sodium diet   - still smoking cigarettes, not ready to quit     Chronic Pain   - use tumeric daily, and otc NSAIDs   - Dr. Jaimes  for pain management      Anxiety/depression:  -Venlafaxine 75 mg, follows psych at [place]     IFG:  -Poor diet, active with her grandchildren     Preventative  Mammogram: last one 2020, nml, yearly , not interested   Colonoscopy: done 2016, needs to 5 years   Vaccination: Declines flu  Pap: S/p hysterectomy    Review of Systems    Past Medical History:   Diagnosis Date    Depression 1990    Eczema 1992    Hypertension 2018    Varicella 1968        Past Surgical History:   Procedure Laterality Date    HYSTERECTOMY      TONSILLECTOMY  1972        Current Outpatient Medications   Medication Instructions    [START ON 5/16/2024] amLODIPine (NORVASC) 10 mg, oral, Daily    cholecalciferol, vitamin D3, (VITAMIN D3 ORAL) oral    fluocinonide (Lidex) 0.05 % ointment Apply twice daily to affected areas of hands (avoid face, groin, body folds) for 2 weeks, taper to weekends only for persistent areas; repeat every 6-8 weeks as needed for flares    loratadine (CLARITIN) 10 mg, oral, Daily    predniSONE (Deltasone) 10 mg tablet Take 4 tablets (40 mg) by mouth once daily for 3 days, THEN 3 tablets (30 mg) once daily for 2 days, THEN 2 tablets (20 mg) once daily for 1 day, THEN 1 tablet (10 mg) once daily for 1 day, THEN 0.5 tablets (5 mg) once daily for 2 days.    TURMERIC ORAL oral    [START ON 5/16/2024] venlafaxine XR (EFFEXOR-XR) 75 mg, oral, Every morning    vitamin E acetate (VITAMIN E ORAL) oral        Allergies   Allergen Reactions    Penicillin Other        Social History     Socioeconomic History    Marital status:      Spouse name: Not on file    Number of children: Not on file    Years of education: Not on file    Highest education  level: Not on file   Occupational History    Not on file   Tobacco Use    Smoking status: Every Day     Current packs/day: 2.00     Average packs/day: 2.0 packs/day for 30.0 years (60.0 ttl pk-yrs)     Types: Cigarettes    Smokeless tobacco: Never   Substance and Sexual Activity    Alcohol use: Yes     Alcohol/week: 3.0 standard drinks of alcohol     Types: 3 Cans of beer per week    Drug use: Not Currently     Types: Marijuana    Sexual activity: Not Currently     Partners: Male     Comment: Hysterectomy   Other Topics Concern    Not on file   Social History Narrative    Not on file     Social Determinants of Health     Financial Resource Strain: Not on file   Food Insecurity: Not on file   Transportation Needs: Not on file   Physical Activity: Not on file   Stress: Not on file   Social Connections: Not on file   Intimate Partner Violence: Not on file   Housing Stability: Not on file        Family History   Problem Relation Name Age of Onset    Alcohol abuse Brother Alex         Objective     OBJECTIVE:     Vitals:    04/17/24 0932   BP: 122/90   Pulse: 94   SpO2: 96%   Weight: 83 kg (183 lb)        Physical Exam  HENT:      Head: Normocephalic and atraumatic.      Nose: Nose normal.      Mouth/Throat:      Mouth: Mucous membranes are moist.   Eyes:      Pupils: Pupils are equal, round, and reactive to light.   Cardiovascular:      Rate and Rhythm: Normal rate and regular rhythm.      Pulses: Normal pulses.      Heart sounds: No murmur heard.  Pulmonary:      Effort: Pulmonary effort is normal.      Breath sounds: Normal breath sounds.   Abdominal:      Tenderness: There is no abdominal tenderness.   Musculoskeletal:         General: Normal range of motion.      Cervical back: Normal range of motion.   Skin:     General: Skin is warm and dry.      Comments: Bilateral upper extremities with significant erythema, swelling with skin dryness throughout.  This is also seen on patient's neck diffusely   Neurological:       Mental Status: She is alert.   Psychiatric:         Mood and Affect: Mood normal.

## 2024-04-29 ENCOUNTER — HOSPITAL ENCOUNTER (OUTPATIENT)
Dept: RADIOLOGY | Facility: CLINIC | Age: 64
Discharge: HOME | End: 2024-04-29
Payer: COMMERCIAL

## 2024-04-29 VITALS — WEIGHT: 181 LBS | HEIGHT: 67 IN | BODY MASS INDEX: 28.41 KG/M2

## 2024-04-29 DIAGNOSIS — Z12.31 SCREENING MAMMOGRAM FOR BREAST CANCER: ICD-10-CM

## 2024-04-29 PROCEDURE — 77067 SCR MAMMO BI INCL CAD: CPT | Performed by: RADIOLOGY

## 2024-04-29 PROCEDURE — 77067 SCR MAMMO BI INCL CAD: CPT

## 2024-04-29 PROCEDURE — 77063 BREAST TOMOSYNTHESIS BI: CPT | Performed by: RADIOLOGY

## 2024-04-30 ENCOUNTER — TELEPHONE (OUTPATIENT)
Dept: PRIMARY CARE | Facility: CLINIC | Age: 64
End: 2024-04-30
Payer: COMMERCIAL

## 2024-04-30 NOTE — TELEPHONE ENCOUNTER
----- Message from Anjum Fortune DO sent at 4/30/2024  9:38 AM EDT -----  Please let pt know that mammogram is normal. Continue yearly screening.

## 2024-05-02 ENCOUNTER — TELEPHONE (OUTPATIENT)
Dept: PRIMARY CARE | Facility: CLINIC | Age: 64
End: 2024-05-02
Payer: COMMERCIAL

## 2024-05-02 DIAGNOSIS — Z12.11 SCREENING FOR COLON CANCER: Primary | ICD-10-CM

## 2024-05-02 LAB — NONINV COLON CA DNA+OCC BLD SCRN STL QL: NORMAL

## 2024-05-02 NOTE — TELEPHONE ENCOUNTER
----- Message from Anjum Fortune DO sent at 5/2/2024  8:40 AM EDT -----  Please call patient and let then know the following:  The cologaurd test was not done properly. I will send a new order

## 2024-05-10 DIAGNOSIS — L30.1 DYSHIDROTIC DERMATITIS: ICD-10-CM

## 2024-05-10 RX ORDER — LORATADINE 10 MG/1
10 TABLET ORAL DAILY
Qty: 90 TABLET | Refills: 1 | Status: SHIPPED | OUTPATIENT
Start: 2024-05-10

## 2024-10-16 ENCOUNTER — OFFICE VISIT (OUTPATIENT)
Dept: PRIMARY CARE | Facility: CLINIC | Age: 64
End: 2024-10-16
Payer: COMMERCIAL

## 2024-10-16 ENCOUNTER — APPOINTMENT (OUTPATIENT)
Dept: PRIMARY CARE | Facility: CLINIC | Age: 64
End: 2024-10-16
Payer: COMMERCIAL

## 2024-10-16 VITALS
BODY MASS INDEX: 27.28 KG/M2 | SYSTOLIC BLOOD PRESSURE: 134 MMHG | TEMPERATURE: 97.3 F | HEIGHT: 68 IN | OXYGEN SATURATION: 94 % | DIASTOLIC BLOOD PRESSURE: 84 MMHG | HEART RATE: 79 BPM | WEIGHT: 180 LBS

## 2024-10-16 DIAGNOSIS — I10 ESSENTIAL HYPERTENSION: ICD-10-CM

## 2024-10-16 DIAGNOSIS — F32.1 MODERATE MAJOR DEPRESSION, SINGLE EPISODE (MULTI): ICD-10-CM

## 2024-10-16 DIAGNOSIS — Z11.59 NEED FOR HEPATITIS C SCREENING TEST: ICD-10-CM

## 2024-10-16 DIAGNOSIS — Z00.00 ROUTINE ADULT HEALTH MAINTENANCE: ICD-10-CM

## 2024-10-16 DIAGNOSIS — F17.200 NICOTINE USE DISORDER: ICD-10-CM

## 2024-10-16 DIAGNOSIS — Z11.4 SCREENING FOR HIV (HUMAN IMMUNODEFICIENCY VIRUS): ICD-10-CM

## 2024-10-16 DIAGNOSIS — L30.9 ECZEMA, UNSPECIFIED TYPE: Primary | ICD-10-CM

## 2024-10-16 DIAGNOSIS — Z00.00 MEDICARE ANNUAL WELLNESS VISIT, SUBSEQUENT: ICD-10-CM

## 2024-10-16 DIAGNOSIS — E78.5 HYPERLIPIDEMIA, UNSPECIFIED HYPERLIPIDEMIA TYPE: ICD-10-CM

## 2024-10-16 DIAGNOSIS — M48.061 SPINAL STENOSIS OF LUMBAR REGION, UNSPECIFIED WHETHER NEUROGENIC CLAUDICATION PRESENT: ICD-10-CM

## 2024-10-16 DIAGNOSIS — F17.210 NICOTINE DEPENDENCE, CIGARETTES, UNCOMPLICATED: ICD-10-CM

## 2024-10-16 PROCEDURE — 3075F SYST BP GE 130 - 139MM HG: CPT | Performed by: FAMILY MEDICINE

## 2024-10-16 PROCEDURE — 3079F DIAST BP 80-89 MM HG: CPT | Performed by: FAMILY MEDICINE

## 2024-10-16 PROCEDURE — 4004F PT TOBACCO SCREEN RCVD TLK: CPT | Performed by: FAMILY MEDICINE

## 2024-10-16 PROCEDURE — G0439 PPPS, SUBSEQ VISIT: HCPCS | Performed by: FAMILY MEDICINE

## 2024-10-16 PROCEDURE — 99396 PREV VISIT EST AGE 40-64: CPT | Performed by: FAMILY MEDICINE

## 2024-10-16 PROCEDURE — 3008F BODY MASS INDEX DOCD: CPT | Performed by: FAMILY MEDICINE

## 2024-10-16 PROCEDURE — 99214 OFFICE O/P EST MOD 30 MIN: CPT | Performed by: FAMILY MEDICINE

## 2024-10-16 RX ORDER — NALTREXONE HYDROCHLORIDE 50 MG/1
50 TABLET, FILM COATED ORAL DAILY
Qty: 30 TABLET | Refills: 2 | Status: SHIPPED | OUTPATIENT
Start: 2024-10-16 | End: 2025-01-14

## 2024-10-16 RX ORDER — FAMOTIDINE 40 MG/1
40 TABLET, FILM COATED ORAL 2 TIMES DAILY
Qty: 60 TABLET | Refills: 5 | Status: SHIPPED | OUTPATIENT
Start: 2024-10-16 | End: 2025-04-14

## 2024-10-16 RX ORDER — AMLODIPINE BESYLATE 10 MG/1
10 TABLET ORAL DAILY
Qty: 90 TABLET | Refills: 1 | Status: SHIPPED | OUTPATIENT
Start: 2024-10-16 | End: 2025-04-14

## 2024-10-16 RX ORDER — VENLAFAXINE HYDROCHLORIDE 75 MG/1
75 CAPSULE, EXTENDED RELEASE ORAL EVERY MORNING
Qty: 90 CAPSULE | Refills: 1 | Status: SHIPPED | OUTPATIENT
Start: 2024-10-16 | End: 2025-04-14

## 2024-10-16 RX ORDER — DUPILUMAB 300 MG/2ML
300 INJECTION, SOLUTION SUBCUTANEOUS
COMMUNITY

## 2024-10-16 RX ORDER — ASCORBIC ACID 250 MG
250 TABLET,CHEWABLE ORAL
COMMUNITY

## 2024-10-16 ASSESSMENT — ACTIVITIES OF DAILY LIVING (ADL)
GROCERY_SHOPPING: INDEPENDENT
MANAGING_FINANCES: INDEPENDENT
TAKING_MEDICATION: INDEPENDENT
DOING_HOUSEWORK: INDEPENDENT
BATHING: INDEPENDENT
DRESSING: INDEPENDENT

## 2024-10-16 ASSESSMENT — PATIENT HEALTH QUESTIONNAIRE - PHQ9
2. FEELING DOWN, DEPRESSED OR HOPELESS: NOT AT ALL
1. LITTLE INTEREST OR PLEASURE IN DOING THINGS: NOT AT ALL
SUM OF ALL RESPONSES TO PHQ9 QUESTIONS 1 AND 2: 0

## 2024-10-16 ASSESSMENT — ENCOUNTER SYMPTOMS: HYPERTENSION: 1

## 2024-10-16 NOTE — PROGRESS NOTES
Assessment     ASSESSMENT/PLAN:      Ref to allergist for skin testing.  Starting on LDN.  H2 blocker for skin sensitivity.  Encouraged to quit smoking.  Fu in 6 mo    Problem List Items Addressed This Visit       Moderate major depression, single episode (Multi)    Relevant Medications    venlafaxine XR (Effexor-XR) 75 mg 24 hr capsule    Essential hypertension    Relevant Medications    amLODIPine (Norvasc) 10 mg tablet     Other Visit Diagnoses       Eczema, unspecified type    -  Primary    Relevant Medications    famotidine (Pepcid) 40 mg tablet    Other Relevant Orders    Referral to Allergy    Need for hepatitis C screening test        Screening for HIV (human immunodeficiency virus)        Medicare annual wellness visit, subsequent        Routine adult health maintenance                Patient Instructions:  Patient Instructions   Dissolve naltrexone tablet in 50 ml of water.  Start by taking 1 ml daily and increase as tolerated to 5 ml daily.  Store in refrigerator.        Signed by: Zhang France MD       FUTURE DIRECTION:       Subjective   SUBJECTIVE:     HPI : Patient is a 64 y.o. female who presents today for the following:     HTN: Amlodipine 10 mg,   - has not been adhering to low sodium diet   - still smoking cigarettes, not ready to quit    Chronic Pain   - use tumeric daily, and otc NSAIDs   - Dr. Jaimes  for pain management     Anxiety/depression:  -Venlafaxine 75 mg, follows psych at [place]    IFG:  -Poor diet, active with her grandchildren    Dermatitis:  Has been having more flare up of her skin.  Would like referral to allergist.    Preventative  Mammogram: last one 2023, nml, yearly ,  Colonoscopy: done 2016, needs to 5 years   Vaccination: Declines flu  Pap: S/p hysterectomy    Review of Systems   All other systems reviewed and are negative.      Past Medical History:   Diagnosis Date    Depression 1990    Eczema 1992    Hypertension 2018    Varicella 1968        Past Surgical History:  "  Procedure Laterality Date    HYSTERECTOMY      TONSILLECTOMY  1972        Current Outpatient Medications   Medication Instructions    amLODIPine (NORVASC) 10 mg, oral, Daily    ascorbic acid (VITAMIN C) 250 mg    cholecalciferol, vitamin D3, (VITAMIN D3 ORAL) Take by mouth.    Dupixent Syringe 300 mg, Every 14 days    famotidine (PEPCID) 40 mg, oral, 2 times daily    TURMERIC ORAL Take by mouth.    venlafaxine XR (EFFEXOR-XR) 75 mg, oral, Every morning        Allergies   Allergen Reactions    Penicillin Other        Social History     Socioeconomic History    Marital status:      Spouse name: Not on file    Number of children: Not on file    Years of education: Not on file    Highest education level: Not on file   Occupational History    Not on file   Tobacco Use    Smoking status: Every Day     Current packs/day: 2.00     Average packs/day: 2.0 packs/day for 30.0 years (60.0 ttl pk-yrs)     Types: Cigarettes    Smokeless tobacco: Never   Substance and Sexual Activity    Alcohol use: Yes     Alcohol/week: 3.0 standard drinks of alcohol     Types: 3 Cans of beer per week    Drug use: Not Currently     Types: Marijuana    Sexual activity: Not Currently     Partners: Male     Comment: Hysterectomy   Other Topics Concern    Not on file   Social History Narrative    Not on file     Social Drivers of Health     Financial Resource Strain: Not on file   Food Insecurity: Not on file   Transportation Needs: Not on file   Physical Activity: Not on file   Stress: Not on file   Social Connections: Not on file   Intimate Partner Violence: Not on file   Housing Stability: Not on file        Family History   Problem Relation Name Age of Onset    Alcohol abuse Brother Alex         Objective     OBJECTIVE:     Vitals:    10/16/24 1046   BP: 134/84   Pulse: 79   Temp: 36.3 °C (97.3 °F)   SpO2: 94%   Weight: 81.6 kg (180 lb)   Height: 1.727 m (5' 8\")        Physical Exam  HENT:      Head: Normocephalic and atraumatic.      " Nose: Nose normal.      Mouth/Throat:      Mouth: Mucous membranes are moist.   Eyes:      Pupils: Pupils are equal, round, and reactive to light.   Cardiovascular:      Rate and Rhythm: Normal rate and regular rhythm.      Pulses: Normal pulses.      Heart sounds: No murmur heard.  Pulmonary:      Effort: Pulmonary effort is normal.      Breath sounds: Normal breath sounds.   Abdominal:      Tenderness: There is no abdominal tenderness.   Musculoskeletal:         General: Normal range of motion.      Cervical back: Normal range of motion.   Skin:     General: Skin is warm and dry.      Comments: Diffuse crusting, purulent drainage, dryness, on bilateral hands   Neurological:      Mental Status: She is alert.   Psychiatric:         Mood and Affect: Mood normal.           Hypertension

## 2024-10-16 NOTE — PATIENT INSTRUCTIONS
Dissolve naltrexone tablet in 50 ml of water.  Start by taking 1 ml daily and increase as tolerated to 5 ml daily.  Store in refrigerator.    [facility]    Listen to sermons from Gordy Downs

## 2024-11-05 ENCOUNTER — APPOINTMENT (OUTPATIENT)
Dept: RADIOLOGY | Facility: CLINIC | Age: 64
End: 2024-11-05
Payer: COMMERCIAL

## 2024-11-18 DIAGNOSIS — L30.9 ECZEMA, UNSPECIFIED TYPE: ICD-10-CM

## 2024-11-19 RX ORDER — FAMOTIDINE 40 MG/1
40 TABLET, FILM COATED ORAL 2 TIMES DAILY
Qty: 180 TABLET | Refills: 1 | Status: SHIPPED | OUTPATIENT
Start: 2024-11-19

## 2025-02-19 ENCOUNTER — APPOINTMENT (OUTPATIENT)
Dept: RADIOLOGY | Facility: CLINIC | Age: 65
End: 2025-02-19
Payer: MEDICARE

## 2025-03-13 ENCOUNTER — TELEPHONE (OUTPATIENT)
Dept: PRIMARY CARE | Facility: CLINIC | Age: 65
End: 2025-03-13
Payer: COMMERCIAL

## 2025-03-13 NOTE — TELEPHONE ENCOUNTER
Called pt and pt informed of provider message and moved to [facility] schedule on 4/16

## 2025-03-13 NOTE — TELEPHONE ENCOUNTER
----- Message from Paula Hollingsworth sent at 3/12/2025  5:58 PM EDT -----  Regarding: Cancel appt  Please cancel appt on 4/16. Not accepting new patients.

## 2025-03-13 NOTE — TELEPHONE ENCOUNTER
Pt called my vm at [phone] requesting to see [facility] as PCP, she states she saw [facility] in 10/24 and wants to keep seeing him?  Please advise Thx

## 2025-04-16 ENCOUNTER — APPOINTMENT (OUTPATIENT)
Dept: PRIMARY CARE | Facility: CLINIC | Age: 65
End: 2025-04-16
Payer: MEDICARE

## 2025-04-16 ENCOUNTER — APPOINTMENT (OUTPATIENT)
Dept: PRIMARY CARE | Facility: CLINIC | Age: 65
End: 2025-04-16
Payer: COMMERCIAL

## 2025-04-16 VITALS
BODY MASS INDEX: 27.83 KG/M2 | WEIGHT: 183 LBS | SYSTOLIC BLOOD PRESSURE: 122 MMHG | DIASTOLIC BLOOD PRESSURE: 84 MMHG | TEMPERATURE: 97.3 F | HEART RATE: 84 BPM

## 2025-04-16 DIAGNOSIS — F17.200 NICOTINE USE DISORDER: ICD-10-CM

## 2025-04-16 DIAGNOSIS — I10 ESSENTIAL HYPERTENSION: Primary | ICD-10-CM

## 2025-04-16 PROCEDURE — G2211 COMPLEX E/M VISIT ADD ON: HCPCS | Performed by: FAMILY MEDICINE

## 2025-04-16 PROCEDURE — 99214 OFFICE O/P EST MOD 30 MIN: CPT | Performed by: FAMILY MEDICINE

## 2025-04-16 PROCEDURE — 3079F DIAST BP 80-89 MM HG: CPT | Performed by: FAMILY MEDICINE

## 2025-04-16 PROCEDURE — 3074F SYST BP LT 130 MM HG: CPT | Performed by: FAMILY MEDICINE

## 2025-04-16 RX ORDER — BUPROPION HYDROCHLORIDE 150 MG/1
150 TABLET ORAL EVERY MORNING
Qty: 90 TABLET | Refills: 1 | Status: SHIPPED | OUTPATIENT
Start: 2025-04-16 | End: 2025-10-13

## 2025-04-16 RX ORDER — VALSARTAN 160 MG/1
160 TABLET ORAL DAILY
Qty: 90 TABLET | Refills: 1 | Status: SHIPPED | OUTPATIENT
Start: 2025-04-16 | End: 2025-10-13

## 2025-04-16 ASSESSMENT — PATIENT HEALTH QUESTIONNAIRE - PHQ9
2. FEELING DOWN, DEPRESSED OR HOPELESS: NOT AT ALL
SUM OF ALL RESPONSES TO PHQ9 QUESTIONS 1 AND 2: 0
1. LITTLE INTEREST OR PLEASURE IN DOING THINGS: NOT AT ALL

## 2025-04-16 ASSESSMENT — ENCOUNTER SYMPTOMS: HYPERTENSION: 1

## 2025-04-16 NOTE — PROGRESS NOTES
Assessment     ASSESSMENT/PLAN:      Switching to valsartan.  Try wellbutrin for smoking.  Get BP cuff.  Fu in 6 mo    Problem List Items Addressed This Visit    None        Patient Instructions:  There are no Patient Instructions on file for this visit.      Signed by: Zhang France MD       FUTURE DIRECTION:       Subjective   SUBJECTIVE:     HPI : Patient is a 64 y.o. female who presents today for the following:     HTN: Amlodipine 10 mg, gets skin issues and irritation.  Only taking it intermittently.  - has not been adhering to low sodium diet   - still smoking cigarettes, not ready to quit    Chronic Pain   - use tumeric daily, and otc NSAIDs   - Dr. Jaimes  for pain management     Anxiety/depression:  -Venlafaxine 75 mg, follows psych at [place]    IFG:  -Poor diet, active with her grandchildren    Dermatitis:  Has been having more flare up of her skin.  Would like referral to allergist.    Preventative  Mammogram: last one 2023, nml, yearly ,  Colonoscopy: done 2016, needs to 5 years   Vaccination: Declines flu  Pap: S/p hysterectomy    Review of Systems   All other systems reviewed and are negative.      Past Medical History:   Diagnosis Date    Depression 1990    Eczema 1992    Hypertension 2018    Varicella 1968        Past Surgical History:   Procedure Laterality Date    HYSTERECTOMY      TONSILLECTOMY  1972        Current Outpatient Medications   Medication Instructions    amLODIPine (NORVASC) 10 mg, oral, Daily    ascorbic acid (VITAMIN C) 250 mg    cholecalciferol, vitamin D3, (VITAMIN D3 ORAL) Take by mouth.    Dupixent Syringe 300 mg, Every 14 days    famotidine (PEPCID) 40 mg, oral, 2 times daily    TURMERIC ORAL Take by mouth.    venlafaxine XR (EFFEXOR-XR) 75 mg, oral, Every morning        Allergies   Allergen Reactions    Penicillin Other        Social History     Socioeconomic History    Marital status:      Spouse name: Not on file    Number of children: Not on file    Years of  education: Not on file    Highest education level: Not on file   Occupational History    Not on file   Tobacco Use    Smoking status: Every Day     Current packs/day: 2.00     Average packs/day: 2.0 packs/day for 30.0 years (60.0 ttl pk-yrs)     Types: Cigarettes    Smokeless tobacco: Never   Vaping Use    Vaping status: Never Used   Substance and Sexual Activity    Alcohol use: Not Currently     Alcohol/week: 2.0 standard drinks of alcohol     Types: 2 Glasses of wine per week    Drug use: Not Currently     Types: Marijuana    Sexual activity: Not Currently     Partners: Male     Comment: Hysterectomy   Other Topics Concern    Not on file   Social History Narrative    Not on file     Social Drivers of Health     Financial Resource Strain: Not on file   Food Insecurity: Not on file   Transportation Needs: Not on file   Physical Activity: Not on file   Stress: Not on file   Social Connections: Not on file   Intimate Partner Violence: Not on file   Housing Stability: Not on file        Family History   Problem Relation Name Age of Onset    Alcohol abuse Brother Alex         Objective     OBJECTIVE:     Vitals:    04/16/25 1051   BP: 122/84   Pulse: 84   Temp: 36.3 °C (97.3 °F)   Weight: 83 kg (183 lb)        Physical Exam  HENT:      Head: Normocephalic and atraumatic.      Nose: Nose normal.      Mouth/Throat:      Mouth: Mucous membranes are moist.   Eyes:      Pupils: Pupils are equal, round, and reactive to light.   Cardiovascular:      Rate and Rhythm: Normal rate and regular rhythm.      Pulses: Normal pulses.      Heart sounds: No murmur heard.  Pulmonary:      Effort: Pulmonary effort is normal.      Breath sounds: Normal breath sounds.   Abdominal:      Tenderness: There is no abdominal tenderness.   Musculoskeletal:         General: Normal range of motion.      Cervical back: Normal range of motion.   Skin:     General: Skin is warm and dry.   Neurological:      Mental Status: She is alert.   Psychiatric:          Mood and Affect: Mood normal.           Hypertension

## 2025-05-02 ENCOUNTER — TELEPHONE (OUTPATIENT)
Dept: PRIMARY CARE | Facility: CLINIC | Age: 65
End: 2025-05-02
Payer: MEDICARE

## 2025-05-02 DIAGNOSIS — M48.061 SPINAL STENOSIS OF LUMBAR REGION, UNSPECIFIED WHETHER NEUROGENIC CLAUDICATION PRESENT: Primary | ICD-10-CM

## 2025-05-02 NOTE — TELEPHONE ENCOUNTER
Received fax from [employer] - rx naltrexone from 10/24 has , they need new rx sent to [facility].    Next OV 10/15  Pt compliant  Pt uses [facility] Thx

## 2025-05-05 RX ORDER — NALTREXONE HYDROCHLORIDE 50 MG/1
50 TABLET, FILM COATED ORAL DAILY
Qty: 30 TABLET | Refills: 11 | Status: SHIPPED | OUTPATIENT
Start: 2025-05-05 | End: 2026-05-05

## 2025-07-27 DIAGNOSIS — F32.1 MODERATE MAJOR DEPRESSION, SINGLE EPISODE (MULTI): ICD-10-CM

## 2025-07-28 RX ORDER — VENLAFAXINE HYDROCHLORIDE 75 MG/1
75 CAPSULE, EXTENDED RELEASE ORAL EVERY MORNING
Qty: 90 CAPSULE | Refills: 1 | Status: SHIPPED | OUTPATIENT
Start: 2025-07-28 | End: 2026-01-24

## 2025-10-15 ENCOUNTER — APPOINTMENT (OUTPATIENT)
Dept: PRIMARY CARE | Facility: CLINIC | Age: 65
End: 2025-10-15
Payer: MEDICARE